# Patient Record
Sex: MALE
[De-identification: names, ages, dates, MRNs, and addresses within clinical notes are randomized per-mention and may not be internally consistent; named-entity substitution may affect disease eponyms.]

---

## 2019-08-13 ENCOUNTER — APPOINTMENT (OUTPATIENT)
Dept: UROLOGY | Facility: CLINIC | Age: 59
End: 2019-08-13
Payer: COMMERCIAL

## 2019-08-13 VITALS
HEIGHT: 70 IN | RESPIRATION RATE: 17 BRPM | SYSTOLIC BLOOD PRESSURE: 115 MMHG | WEIGHT: 160 LBS | BODY MASS INDEX: 22.9 KG/M2 | HEART RATE: 58 BPM | TEMPERATURE: 98.8 F | DIASTOLIC BLOOD PRESSURE: 77 MMHG

## 2019-08-13 DIAGNOSIS — Z86.79 PERSONAL HISTORY OF OTHER DISEASES OF THE CIRCULATORY SYSTEM: ICD-10-CM

## 2019-08-13 DIAGNOSIS — Z72.89 OTHER PROBLEMS RELATED TO LIFESTYLE: ICD-10-CM

## 2019-08-13 DIAGNOSIS — Z84.1 FAMILY HISTORY OF DISORDERS OF KIDNEY AND URETER: ICD-10-CM

## 2019-08-13 DIAGNOSIS — Z78.9 OTHER SPECIFIED HEALTH STATUS: ICD-10-CM

## 2019-08-13 DIAGNOSIS — Z80.3 FAMILY HISTORY OF MALIGNANT NEOPLASM OF BREAST: ICD-10-CM

## 2019-08-13 PROCEDURE — 99203 OFFICE O/P NEW LOW 30 MIN: CPT

## 2019-08-13 NOTE — PHYSICAL EXAM
[General Appearance - Well Developed] : well developed [General Appearance - Well Nourished] : well nourished [Normal Appearance] : normal appearance [Well Groomed] : well groomed [Edema] : no peripheral edema [General Appearance - In No Acute Distress] : no acute distress [Respiration, Rhythm And Depth] : normal respiratory rhythm and effort [Abdomen Soft] : soft [Exaggerated Use Of Accessory Muscles For Inspiration] : no accessory muscle use [Urinary Bladder Findings] : the bladder was normal on palpation [Prostate Tenderness] : the prostate was not tender [Costovertebral Angle Tenderness] : no ~M costovertebral angle tenderness [No Prostate Nodules] : no prostate nodules [] : no rash [Normal Station and Gait] : the gait and station were normal for the patient's age [Sensation] : the sensory exam was normal to light touch and pinprick [No Focal Deficits] : no focal deficits [Affect] : the affect was normal [Oriented To Time, Place, And Person] : oriented to person, place, and time [Not Anxious] : not anxious

## 2019-08-26 NOTE — ASSESSMENT
[FreeTextEntry1] : 57 yo male who presents for prostate cancer screening.  Patients history of PSA values is reassuring.  Discussed PSA testing and answered all the patients questions.  Patient plans to have PSA screening annual with his PCP. \par \par Follow up 1 year with PSA results from most recent PSA from PCP office.

## 2019-08-26 NOTE — HISTORY OF PRESENT ILLNESS
[FreeTextEntry1] : Here to discuss PSA testing, prostate cancer screening.\par Had prior biopsy in 2010: no cancer seen, did have inflammation and atrophy.\par \par PSA has generally been between 1.3 to 1.9 ng/mL.\par \par No hematuria, dysuria.\par \par Has baseline urinary frequency q3-4 hours sometime increase to q2-3 hours.\par Has been the same for 10 years, not bothersome.

## 2021-11-22 ENCOUNTER — APPOINTMENT (OUTPATIENT)
Dept: UROLOGY | Facility: CLINIC | Age: 61
End: 2021-11-22
Payer: COMMERCIAL

## 2021-11-22 VITALS — SYSTOLIC BLOOD PRESSURE: 137 MMHG | DIASTOLIC BLOOD PRESSURE: 82 MMHG | HEART RATE: 84 BPM | RESPIRATION RATE: 16 BRPM

## 2021-11-22 DIAGNOSIS — N52.01 ERECTILE DYSFUNCTION DUE TO ARTERIAL INSUFFICIENCY: ICD-10-CM

## 2021-11-22 LAB — PSA SERPL-MCNC: 1.81 NG/ML

## 2021-11-22 PROCEDURE — 99214 OFFICE O/P EST MOD 30 MIN: CPT

## 2021-11-22 RX ORDER — SILDENAFIL 50 MG/1
50 TABLET ORAL
Qty: 10 | Refills: 11 | Status: ACTIVE | COMMUNITY
Start: 2021-11-22 | End: 1900-01-01

## 2021-11-22 NOTE — HISTORY OF PRESENT ILLNESS
[FreeTextEntry1] : Patient is a 61 year old man with complaints of increased urinary frequency.\par Reports stream is good, Has complaints of frequency which he has had for several years. Denies urgency. Denies any hematuria, dysuria or incontinence. Denies any UTI's. \par November 12th PSA 1.81 ng/mL \par Prostate Biopsy 2010- Benign \par Does not feel that urinary frequency is significant enough for medication. \par \par Patient reports that having increased difficulty with erectile function. Able to achieve full erection but difficult to maintain erection.  Function affects libido.  +nocturnal erections.  No pain or curvature with erection.

## 2021-11-22 NOTE — ASSESSMENT
[FreeTextEntry1] : PSA results reviewed over the past 10 years.\par Recent PSA 1.8 ng/mL.\par Urinary function stable.  Does not need medication at this time.\par Prior US demonstrated prostate volume of 60 mL.\par Patient will follow up in one year with PSA prior to visit.\par \par ED:  recommend trial of sildenafil 50 mg prn.\par Goals of medication reviewed.  Discussed the potential adverse effects of the medication.  Discussed the proper administration of the medication.\par \par

## 2022-09-01 ENCOUNTER — APPOINTMENT (OUTPATIENT)
Dept: UROLOGY | Facility: CLINIC | Age: 62
End: 2022-09-01

## 2022-09-01 VITALS
RESPIRATION RATE: 18 BRPM | SYSTOLIC BLOOD PRESSURE: 144 MMHG | BODY MASS INDEX: 23.62 KG/M2 | WEIGHT: 165 LBS | DIASTOLIC BLOOD PRESSURE: 88 MMHG | HEART RATE: 85 BPM | HEIGHT: 70 IN

## 2022-09-01 DIAGNOSIS — R35.0 FREQUENCY OF MICTURITION: ICD-10-CM

## 2022-09-01 PROCEDURE — 99213 OFFICE O/P EST LOW 20 MIN: CPT

## 2022-09-01 RX ORDER — LISINOPRIL 30 MG/1
TABLET ORAL
Refills: 0 | Status: COMPLETED | COMMUNITY
End: 2022-09-01

## 2022-09-15 ENCOUNTER — TRANSCRIPTION ENCOUNTER (OUTPATIENT)
Age: 62
End: 2022-09-15

## 2022-09-18 NOTE — HISTORY OF PRESENT ILLNESS
[FreeTextEntry1] : Patient is a 61 year old man comes in today for elevated PSA\par \par November 2021 - PSA 1.81 ng/mL \par August 29 th, 2022 -  PSA 5.5 ng/mL \par \par Reports stream is fair. Denies any increased urgency or frequency \par Denies any hematuria, dysuria or incontinence.

## 2022-09-18 NOTE — ASSESSMENT
[FreeTextEntry1] : PSA results reviewed.\par Given abrupt change, recommend repeat PSA again in 4 weeks.\par If remains elevated, then will order MRI prostate.

## 2022-09-18 NOTE — PHYSICAL EXAM
[General Appearance - Well Developed] : well developed [General Appearance - Well Nourished] : well nourished [Normal Appearance] : normal appearance [Well Groomed] : well groomed [General Appearance - In No Acute Distress] : no acute distress [Abdomen Soft] : soft [Abdomen Tenderness] : non-tender [Costovertebral Angle Tenderness] : no ~M costovertebral angle tenderness [Urethral Meatus] : meatus normal [Urinary Bladder Findings] : the bladder was normal on palpation [Scrotum] : the scrotum was normal [Testes Mass (___cm)] : there were no testicular masses [No Prostate Nodules] : no prostate nodules [Edema] : no peripheral edema [] : no respiratory distress [Respiration, Rhythm And Depth] : normal respiratory rhythm and effort [Exaggerated Use Of Accessory Muscles For Inspiration] : no accessory muscle use [Oriented To Time, Place, And Person] : oriented to person, place, and time [Affect] : the affect was normal [Mood] : the mood was normal [Not Anxious] : not anxious [Normal Station and Gait] : the gait and station were normal for the patient's age [No Focal Deficits] : no focal deficits [No Palpable Adenopathy] : no palpable adenopathy [FreeTextEntry1] : DIAMOND smooth, symmetrical, not enlarged

## 2022-09-29 DIAGNOSIS — Z12.5 ENCOUNTER FOR SCREENING FOR MALIGNANT NEOPLASM OF PROSTATE: ICD-10-CM

## 2022-09-29 DIAGNOSIS — R97.20 ELEVATED PROSTATE, SPECIFIC ANTIGEN [PSA]: ICD-10-CM

## 2022-09-29 LAB
PSA FREE FLD-MCNC: 13 %
PSA FREE SERPL-MCNC: 0.35 NG/ML
PSA SERPL-MCNC: 2.69 NG/ML

## 2023-03-31 LAB
PSA FREE FLD-MCNC: 18 %
PSA FREE SERPL-MCNC: 0.32 NG/ML
PSA SERPL-MCNC: 1.79 NG/ML

## 2024-05-22 LAB
PSA FREE FLD-MCNC: 22 %
PSA FREE SERPL-MCNC: 0.46 NG/ML
PSA SERPL-MCNC: 2.08 NG/ML

## 2024-09-09 ENCOUNTER — APPOINTMENT (OUTPATIENT)
Dept: UROLOGY | Facility: CLINIC | Age: 64
End: 2024-09-09
Payer: MEDICARE

## 2024-09-09 VITALS
RESPIRATION RATE: 16 BRPM | DIASTOLIC BLOOD PRESSURE: 77 MMHG | TEMPERATURE: 97.6 F | OXYGEN SATURATION: 98 % | SYSTOLIC BLOOD PRESSURE: 161 MMHG | HEART RATE: 72 BPM

## 2024-09-09 DIAGNOSIS — Z12.5 ENCOUNTER FOR SCREENING FOR MALIGNANT NEOPLASM OF PROSTATE: ICD-10-CM

## 2024-09-09 PROCEDURE — G2211 COMPLEX E/M VISIT ADD ON: CPT

## 2024-09-09 PROCEDURE — 99203 OFFICE O/P NEW LOW 30 MIN: CPT

## 2024-09-09 NOTE — HISTORY OF PRESENT ILLNESS
[FreeTextEntry1] :     Patient is a 63 year old man comes in today for elevated PSA  PSA Hx May 17, 2024 - PSA 2.08 ng/mL March 28, 2023 - PSA 1.79 ng/mL November 2021 - PSA 1.81 ng/mL September 28, 2022 - PSA  2.69 ng/mL August 29 th, 2022 - PSA 5.5 ng/mL  Reports stream is fair. Nocturia 1 -2 times. Denies any increased urgency or frequency  Denies any hematuria, dysuria or incontinence.  Denies constipation.   ED: Poor erections and libido. Not interested in PDE5 inhibitors.

## 2024-09-09 NOTE — ASSESSMENT
[FreeTextEntry1] : PSA results reviewed, non concerning change. Repeat PSA f/t today. Low libido: check serum T Erectile dysfunction: does not want to start medications at this time.

## 2024-09-16 LAB — TESTOST SERPL-MCNC: 336 NG/DL

## 2024-09-19 LAB
PSA FREE FLD-MCNC: 19 %
PSA FREE SERPL-MCNC: 0.38 NG/ML
PSA SERPL-MCNC: 1.98 NG/ML

## 2024-12-25 PROBLEM — F10.90 ALCOHOL USE: Status: ACTIVE | Noted: 2019-08-13

## 2025-04-17 LAB
PSA FREE FLD-MCNC: 20 %
PSA FREE SERPL-MCNC: 0.38 NG/ML
PSA SERPL-MCNC: 1.89 NG/ML